# Patient Record
Sex: FEMALE | Race: BLACK OR AFRICAN AMERICAN | Employment: FULL TIME | URBAN - METROPOLITAN AREA
[De-identification: names, ages, dates, MRNs, and addresses within clinical notes are randomized per-mention and may not be internally consistent; named-entity substitution may affect disease eponyms.]

---

## 2020-09-12 ENCOUNTER — HOSPITAL ENCOUNTER (EMERGENCY)
Age: 60
Discharge: HOME | End: 2020-09-13
Attending: EMERGENCY MEDICINE
Payer: COMMERCIAL

## 2020-09-12 PROCEDURE — 99282 EMERGENCY DEPT VISIT SF MDM: CPT

## 2020-09-12 PROCEDURE — 6370000000 HC RX 637 (ALT 250 FOR IP): Performed by: EMERGENCY MEDICINE

## 2020-09-12 RX ORDER — IBUPROFEN 400 MG/1
400 TABLET ORAL ONCE
Status: COMPLETED | OUTPATIENT
Start: 2020-09-13 | End: 2020-09-12

## 2020-09-12 RX ORDER — HYDROCODONE BITARTRATE AND ACETAMINOPHEN 5; 325 MG/1; MG/1
1 TABLET ORAL ONCE
Status: COMPLETED | OUTPATIENT
Start: 2020-09-13 | End: 2020-09-12

## 2020-09-12 RX ADMIN — HYDROCODONE BITARTRATE AND ACETAMINOPHEN 1 TABLET: 5; 325 TABLET ORAL at 23:55

## 2020-09-12 RX ADMIN — IBUPROFEN 400 MG: 400 TABLET, FILM COATED ORAL at 23:55

## 2020-09-12 ASSESSMENT — PAIN DESCRIPTION - LOCATION: LOCATION: ABDOMEN

## 2020-09-12 ASSESSMENT — PAIN SCALES - GENERAL
PAINLEVEL_OUTOF10: 9
PAINLEVEL_OUTOF10: 9

## 2020-09-12 ASSESSMENT — PAIN DESCRIPTION - ORIENTATION: ORIENTATION: RIGHT

## 2020-09-12 ASSESSMENT — PAIN DESCRIPTION - PAIN TYPE: TYPE: ACUTE PAIN

## 2020-09-12 ASSESSMENT — PAIN DESCRIPTION - FREQUENCY: FREQUENCY: CONTINUOUS

## 2020-09-12 ASSESSMENT — PAIN DESCRIPTION - DESCRIPTORS: DESCRIPTORS: ACHING;BURNING;CONSTANT

## 2020-09-13 VITALS
HEIGHT: 60 IN | RESPIRATION RATE: 20 BRPM | BODY MASS INDEX: 33.41 KG/M2 | HEART RATE: 60 BPM | OXYGEN SATURATION: 98 % | DIASTOLIC BLOOD PRESSURE: 89 MMHG | SYSTOLIC BLOOD PRESSURE: 187 MMHG | TEMPERATURE: 97.5 F | WEIGHT: 170.19 LBS

## 2020-09-13 PROCEDURE — 6360000002 HC RX W HCPCS: Performed by: EMERGENCY MEDICINE

## 2020-09-13 PROCEDURE — 90471 IMMUNIZATION ADMIN: CPT | Performed by: EMERGENCY MEDICINE

## 2020-09-13 PROCEDURE — 90715 TDAP VACCINE 7 YRS/> IM: CPT | Performed by: EMERGENCY MEDICINE

## 2020-09-13 RX ORDER — MELOXICAM 15 MG/1
15 TABLET ORAL DAILY PRN
COMMUNITY
Start: 2020-07-07

## 2020-09-13 RX ORDER — AMLODIPINE BESYLATE 10 MG/1
10 TABLET ORAL DAILY
COMMUNITY
Start: 2020-05-13

## 2020-09-13 RX ORDER — HYDRALAZINE HYDROCHLORIDE 25 MG/1
25 TABLET, FILM COATED ORAL 3 TIMES DAILY
COMMUNITY
Start: 2020-01-16

## 2020-09-13 RX ORDER — ZOLPIDEM TARTRATE 10 MG/1
TABLET ORAL NIGHTLY PRN
COMMUNITY

## 2020-09-13 RX ORDER — DIAPER,BRIEF,INFANT-TODD,DISP
EACH MISCELLANEOUS 2 TIMES DAILY
COMMUNITY
Start: 2020-08-17

## 2020-09-13 RX ORDER — ESCITALOPRAM OXALATE 20 MG/1
20 TABLET ORAL DAILY
COMMUNITY
Start: 2020-04-23

## 2020-09-13 RX ADMIN — TETANUS TOXOID, REDUCED DIPHTHERIA TOXOID AND ACELLULAR PERTUSSIS VACCINE, ADSORBED 0.5 ML: 5; 2.5; 8; 8; 2.5 SUSPENSION INTRAMUSCULAR at 01:08

## 2020-09-13 ASSESSMENT — PAIN SCALES - GENERAL: PAINLEVEL_OUTOF10: 5

## 2020-09-13 ASSESSMENT — PAIN DESCRIPTION - PAIN TYPE: TYPE: ACUTE PAIN

## 2020-09-13 ASSESSMENT — PAIN DESCRIPTION - ORIENTATION: ORIENTATION: RIGHT

## 2020-09-13 ASSESSMENT — PAIN DESCRIPTION - LOCATION: LOCATION: ABDOMEN

## 2020-09-13 ASSESSMENT — PAIN DESCRIPTION - FREQUENCY: FREQUENCY: CONTINUOUS
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
## 2022-11-22 ENCOUNTER — OFFICE VISIT (OUTPATIENT)
Dept: ORTHOPEDIC SURGERY | Age: 62
End: 2022-11-22
Payer: COMMERCIAL

## 2022-11-22 ENCOUNTER — TELEPHONE (OUTPATIENT)
Dept: ORTHOPEDIC SURGERY | Age: 62
End: 2022-11-22

## 2022-11-22 VITALS — WEIGHT: 170 LBS | BODY MASS INDEX: 33.38 KG/M2 | HEIGHT: 60 IN

## 2022-11-22 DIAGNOSIS — M54.41 RIGHT-SIDED LOW BACK PAIN WITH RIGHT-SIDED SCIATICA, UNSPECIFIED CHRONICITY: Primary | ICD-10-CM

## 2022-11-22 DIAGNOSIS — M54.50 LOW BACK PAIN, UNSPECIFIED BACK PAIN LATERALITY, UNSPECIFIED CHRONICITY, UNSPECIFIED WHETHER SCIATICA PRESENT: ICD-10-CM

## 2022-11-22 DIAGNOSIS — M17.11 OSTEOARTHRITIS OF RIGHT KNEE, UNSPECIFIED OSTEOARTHRITIS TYPE: ICD-10-CM

## 2022-11-22 DIAGNOSIS — M22.41 CHONDROMALACIA OF PATELLA, RIGHT: ICD-10-CM

## 2022-11-22 DIAGNOSIS — M25.551 RIGHT HIP PAIN: ICD-10-CM

## 2022-11-22 DIAGNOSIS — M51.36 DDD (DEGENERATIVE DISC DISEASE), LUMBAR: ICD-10-CM

## 2022-11-22 DIAGNOSIS — M25.561 ACUTE PAIN OF RIGHT KNEE: ICD-10-CM

## 2022-11-22 DIAGNOSIS — M65.9 SYNOVITIS OF RIGHT KNEE: ICD-10-CM

## 2022-11-22 DIAGNOSIS — M79.604 RIGHT LEG PAIN: ICD-10-CM

## 2022-11-22 DIAGNOSIS — M16.11 PRIMARY OSTEOARTHRITIS OF RIGHT HIP: ICD-10-CM

## 2022-11-22 PROCEDURE — G8417 CALC BMI ABV UP PARAM F/U: HCPCS | Performed by: FAMILY MEDICINE

## 2022-11-22 PROCEDURE — 20610 DRAIN/INJ JOINT/BURSA W/O US: CPT | Performed by: FAMILY MEDICINE

## 2022-11-22 PROCEDURE — L0625 LO FLEX L1-BELOW L5 PRE OTS: HCPCS | Performed by: FAMILY MEDICINE

## 2022-11-22 PROCEDURE — G8427 DOCREV CUR MEDS BY ELIG CLIN: HCPCS | Performed by: FAMILY MEDICINE

## 2022-11-22 PROCEDURE — 99203 OFFICE O/P NEW LOW 30 MIN: CPT | Performed by: FAMILY MEDICINE

## 2022-11-22 PROCEDURE — G8484 FLU IMMUNIZE NO ADMIN: HCPCS | Performed by: FAMILY MEDICINE

## 2022-11-22 PROCEDURE — L1812 KO ELASTIC W/JOINTS PRE OTS: HCPCS | Performed by: FAMILY MEDICINE

## 2022-11-22 RX ORDER — BETAMETHASONE SODIUM PHOSPHATE AND BETAMETHASONE ACETATE 3; 3 MG/ML; MG/ML
12 INJECTION, SUSPENSION INTRA-ARTICULAR; INTRALESIONAL; INTRAMUSCULAR; SOFT TISSUE ONCE
Status: COMPLETED | OUTPATIENT
Start: 2022-11-22 | End: 2022-11-22

## 2022-11-22 RX ORDER — MELOXICAM 15 MG/1
15 TABLET ORAL DAILY
Qty: 30 TABLET | Refills: 3 | Status: SHIPPED | OUTPATIENT
Start: 2022-11-22

## 2022-11-22 RX ORDER — BUSPIRONE HYDROCHLORIDE 5 MG/1
TABLET ORAL
COMMUNITY
Start: 2022-11-14

## 2022-11-22 RX ORDER — METHYLPREDNISOLONE 4 MG/1
TABLET ORAL
Qty: 21 KIT | Refills: 0 | Status: SHIPPED | OUTPATIENT
Start: 2022-11-22

## 2022-11-22 RX ORDER — LIDOCAINE HYDROCHLORIDE 10 MG/ML
1 INJECTION, SOLUTION INFILTRATION; PERINEURAL ONCE
Status: COMPLETED | OUTPATIENT
Start: 2022-11-22 | End: 2022-11-22

## 2022-11-22 RX ORDER — AMOXICILLIN 500 MG/1
CAPSULE ORAL
COMMUNITY
Start: 2022-09-16

## 2022-11-22 RX ORDER — BUPIVACAINE HYDROCHLORIDE 2.5 MG/ML
2 INJECTION, SOLUTION INFILTRATION; PERINEURAL ONCE
Status: COMPLETED | OUTPATIENT
Start: 2022-11-22 | End: 2022-11-22

## 2022-11-22 RX ADMIN — BETAMETHASONE SODIUM PHOSPHATE AND BETAMETHASONE ACETATE 12 MG: 3; 3 INJECTION, SUSPENSION INTRA-ARTICULAR; INTRALESIONAL; INTRAMUSCULAR; SOFT TISSUE at 11:44

## 2022-11-22 RX ADMIN — LIDOCAINE HYDROCHLORIDE 1 ML: 10 INJECTION, SOLUTION INFILTRATION; PERINEURAL at 11:45

## 2022-11-22 RX ADMIN — BUPIVACAINE HYDROCHLORIDE 5 MG: 2.5 INJECTION, SOLUTION INFILTRATION; PERINEURAL at 11:44

## 2022-11-22 NOTE — PATIENT INSTRUCTIONS
Take Medrol first for 6 days. This is a steroid pack. Flip the package over to the foil side and the directions will tell you to start with 6 pills the first day, 5 pills the second day, etc. Please do not take any other anti-inflammatories with the medrol dose karen as this can upset your stomach. If something else is needed, you may take extra strength tylenol.      Once you are finished with the medrol, then you may start your anti-inflammatory: MELOXICAM 1 TIME PER DAY

## 2022-11-22 NOTE — TELEPHONE ENCOUNTER
Left voicemail for patient that their MRI has been authorized and that they can call and schedule scan at their convenience. Also told them that they can call and schedule a f/u with Dr. Charlene Washington once they have MRI scheduled, leaving at least 2-3 days for our office to receive their results.

## 2022-11-22 NOTE — PROGRESS NOTES
Chief Complaint  Back Problem (NP LUMBAR BACK)      Low back pain with radicular symptoms, R knee pain, R hip pain    History of Present Illness:  Nataly Clark is a pleasant 58 y.o. black female who works as a  at the [employer] who presents today for kind consultation from Dr. Luciano Calles regarding low back pain with radicular symptoms, right knee pain, and right hip pain. She has worked as a  at [employer] for the past 29 years. She states she used to have to carry heavy totes to and from the basement but this has been greatly reduced since she began experiencing symptoms. She states her low back pain began approximately one year ago and has gradually gotten worse since that time. She states her pain originates in her right lower lumbar spine and \"tailbone\" and radiates down the posterior aspect of her right leg extending to her mid calf. This is primarily in the L5-S1 distribution. There is no history of injury fall or trauma prior to becoming symptomatic. Additionally, she complains of right hip pain that radiates anteriorly into her groin and right anteromedial knee pain. She states pain is exacerbated with stairs and makes it hard for her to lift her right leg. She endorses having to \"swing\" her right hip out to climb stairs. She described her back pain as a \"dull ache\" with a \"shooting\" pain down her right leg. Pain is an 8/10. She states slight forward flexion does help to alleviate some of her back pain. She takes 600mg ibuprofen only episodic, which does help some. She states her knee is the most bothersome followed by low back then hip. She has been taking episodic doses of ibuprofen recently saw Dr. Luciano Calles in the office who recommended today's orthopedic consultation regarding worsening mechanical back pain and knee pain as well as pain laterally involving the hip with only mild groin pain.          Pain Assessment  Location of Pain: Back  Location Modifiers: Right  Severity of Pain: 10  Quality of Pain: Sharp, Throbbing  Duration of Pain: Persistent  Frequency of Pain: Constant  Aggravating Factors: Bending, Stretching, Stairs  Limiting Behavior: Yes  Result of Injury: Yes  Work-Related Injury: No  Are there other pain locations you wish to document?: No       Medical History  Patient's medications, allergies, past medical, surgical, social and family histories were reviewed and updated as appropriate. Review of Systems  Pertinent items are noted in HPI  Review of systems reviewed from Patient History Form dated on 11/22/22 and available in the patient's chart under the Media tab. Vital Signs  There were no vitals filed for this visit. General Exam:     Constitutional: Patient is adequately groomed with no evidence of malnutrition  DTRs: Deep tendon reflexes are intact  Mental Status: The patient is oriented to time, place and person. The patient's mood and affect are appropriate. Lymphatic: The lymphatic examination bilaterally reveals all areas to be without enlargement or induration. Vascular: Examination reveals no swelling or calf tenderness. Peripheral pulses are palpable and 2+. Neurological: The patient has good coordination. There is no weakness or sensory deficit. Lumbar/Sacral Spine Examination  Inspection:  No obvious deformity. There is no palpable spasm or focal swelling. Palpation:  Tenderness to palpation about L5/S1. Tenderness to palpation of right lumbar paraspinals right lower lumbar facets with central gluteal tenderness. He does have some tenderness over the IT band mildly returning to her bursa. Rang of Motion:  Slight limitation with right thoracic rotation as compared to the left secondary to hypertonic musculature. She can forward flex her lumbar paraspinal pulling to about 60 degrees.   She does have painful extension right at 6-7 out of 10 with left being about a 4-5 with a 50% reduction in lateral bending rotation. Strength:  4+ out of 5 strength of RLE. Special Tests: He does have pain with lumbar extension. She does complain of some gluteal discomfort with straight leg raising on the right side. Negative on the left. Logrolling and hip impingement testing. Negative belly trace positive Sonia's today. Skin: There are no rashes, ulcerations or lesions. Distal motor sensory and vascular exams intact. Gait: Discomfort with positional change. Reflexes:  Symmetrically preserved     Additional Comments: Evaluation of the right knee does reveal patellofemoral crepitation but only a trace knee joint effusion. She does have tenderness over the medial and lateral patellofemoral facet with tenderness at 7 8 out of 10 reproduced patellar grind testing with diffuse primarily anterior third medial joint line tenderness more so than laterally. Hamstrings and IT bands are tight. I do not sense high-grade instability. Right hip evaluation does reveal the above-mentioned tenderness over the IT band and to lesser degree trochanteric bursa. She does have mild hip weakness 4+ out of 5 with hip flexion and abduction. Once again her straight leg raising does produce gluteal pain on the right but her logroll testing is not overly positive. No high-grade labral clicks or evidence of instability. Mild discomfort with hip impingement testing on the right. Additional Examinations:  Left Lower Extremity: Examination of the left lower extremity does not show any tenderness, deformity or injury. Range of motion is unremarkable. There is no gross instability. There are no rashes, ulcerations or lesions. Strength and tone are normal.  Contralateral exam: Examination of the left knee reveals intact skin. There is no focal tenderness. The patient demonstrates full painless range of motion with regards to flexion and extension. Strength is 5/5 thorough out all planes. Ligamentous stability is grossly intact. Contralateral exam: Examination of the left contralateral hip reveals intact skin. The patient demonstrates full painless range of motion with regards to flexion, abduction, internal and external rotation. There is not tenderness about the greater trochanter. There is a negative straight leg raise against resistance. Strength is 5/5 thorough out all planes. Diagnostic Test Findings:  Plain film radiographs of lumbar spine AP and lateral, right hip/pelvis AP pelvis and frog films, and right knee AP and PA weightbearing sunrise and lateral films obtained today in office and revealed DDD of lumbar spine with facet arthropathy and loss of lumbar lordosis, and mild to moderate degenerative changes about the right hip and evidence of mild/moderate medial and severe anterior compartment right knee osteoarthritis with patellofemoral arthropathy and extensive patellofemoral spurring. Assessment: 1 year status post progressively worsening chronic low back pain with right leg pain suspicious for right chronic L5-S1 radiculopathy. 2.  1 year status post symptomatic severe right anterior knee patellofemoral arthropathy with at least moderate medial compartment osteoarthritis with knee pain and synovitis. 3.  1 year status post symptomatic mild to moderate right hip osteoarthritis with hip weakness and IT band     Impression:  Encounter Diagnoses   Name Primary?     Right-sided low back pain with right-sided sciatica, unspecified chronicity Yes    Right hip pain     Acute pain of right knee     Osteoarthritis of right knee, unspecified osteoarthritis type     Low back pain, unspecified back pain laterality, unspecified chronicity, unspecified whether sciatica present     Synovitis of right knee     Chondromalacia of patella, right     Right leg pain     Primary osteoarthritis of right hip     DDD (degenerative disc disease), lumbar        Office Procedures:  Orders Placed This Encounter   Procedures    XR LUMBAR SPINE (2-3 VIEWS)     Standing Status:   Future     Number of Occurrences:   1     Standing Expiration Date:   11/22/2023    XR HIP 2-3 VW W PELVIS RIGHT     Standing Status:   Future     Number of Occurrences:   1     Standing Expiration Date:   12/22/2022     Order Specific Question:   Reason for exam:     Answer:   hip pain    XR KNEE RIGHT (MIN 4 VIEWS)     4V R Knee     Standing Status:   Future     Number of Occurrences:   1     Standing Expiration Date:   12/22/2022     Order Specific Question:   Reason for exam:     Answer:   Knee Pain    MRI LUMBAR SPINE WO CONTRAST     Standing Status:   Future     Standing Expiration Date:   11/22/2023     Scheduling Instructions:      [facility] [place] [address]      [phone]            AUTH #:      TIME AND DATE TBD      PLEASE CALL PATIENT ONCE APPROVED TO SCHEDULE       PUSH TO [facility] SYSTEM            Remember that it may take several business days to pre-cert your MRI through your insurance. Our office will contact you as soon as we have the approval. We will not give any test results over the phone. Please call [phone] once you have your test day and time to schedule a follow up with Dr. Harrison Woods. Order Specific Question:   Reason for exam:     Answer:   EVALUATE LUMBAR DDD, R/O HNP AT L5, S1 AND FORAMINAL NARROWING    Ambulatory referral to Physical Therapy     Referral Priority:   Routine     Referral Type:   Eval and Treat     Referral Reason:   Specialty Services Required     Referred to Provider:   Gennette Schilder, PT     Requested Specialty:   Physical Therapist     Number of Visits Requested:   1 [id number] - ID DRAIN/INJECT LARGE JOINT/BURSA    Breg Economy Hinged Knee WrapAround Brace     Patient was prescribed a Breg Economy Hinged Wrap Around Knee Brace.   The right knee will require stabilization / immobilization from this semi-rigid / rigid orthosis to improve their function. The orthosis will assist in protecting the affected area, provide functional support and facilitate healing. The patient was educated and fit by a healthcare professional with expert knowledge and specialization in brace application while under the direct supervision of the treating physician. Verbal and written instructions for the use of and application of this item were provided. They were instructed to contact the office immediately should the brace result in increased pain, decreased sensation, increased swelling or worsening of the condition. Doristine Lab and Mook Extensor Lumbosacral Support Brace (Warm and Form)     Patient was prescribed a Bird and Mook Extensor Lumbosacral Support Brace with a pocket. This orthosis is required for the following reasons:    Reduce pain by restricting mobility of the trunk    The patient was educated and fit by a healthcare professional with expert knowledge and specialization in brace application while under the direct supervision of the treating physician. Verbal and written instructions for the use of and application of this item were provided. They were instructed to contact the office immediately should the brace result in increased pain, decreased sensation, increased swelling or worsening of the condition. Treatment Plan:  Treatment options were discussed with Nataly Dobbs. Relevant images and clinical exam findings reviewed and discussed with patient. I do suspect that she has multiple pain generators with her right knee and lower back and right leg symptoms being her most problematic areas with secondary likely right hip IT band with hip weakness. Her knee is her worst problem at this point and she does have very advanced patellofemoral arthropathy. After discussing options, we did inject her right knee today using 2 cc of Celestone, 2 cc of Marcaine, 1 cc Xylocaine. We did place her in a wraparound knee brace. We also gave her a warm-and-form belt and placed her on a Medrol Dosepak to be followed by meloxicam 15 mg daily and we will set her up for lumbar MRI to evaluate for right-sided L4-5/L5-S1 disc herniation and/or foraminal narrowing. I would like for her to start aggressive physical therapy for her back and knee as well as her hip. We will see her back post imaging of her lumbar spine although I think she can start physical therapy at this point. I am okay with her working in the brace. Icing and activity modification was discussed. She will contact us in the interim with questions or concerns. CC: Dr. Jerilyn Perdue    This dictation was performed with a verbal recognition program [facility]) and it was checked for errors. It is possible that there are still dictated errors within this office note. If so, please bring any errors to my attention for an addendum. All efforts were made to ensure that this office note is accurate.

## 2022-11-30 ENCOUNTER — HOSPITAL ENCOUNTER (OUTPATIENT)
Dept: PHYSICAL THERAPY | Age: 62
Setting detail: THERAPIES SERIES
Discharge: HOME | End: 2022-11-30

## 2022-11-30 NOTE — FLOWSHEET NOTE
Physical Therapy  Cancellation/No-show Note  Patient Name:  Morris Lockett  :  1960   Date:  2022  Cancelled visits to date: 0  No-shows to date: 01    For today's appointment patient:  []  Cancelled  []  Rescheduled appointment  [x]  No-show     Reason given by patient:  []  Patient ill  []  Conflicting appointment  []  No transportation    []  Conflict with work  []  No reason given  []  Other:     Comments:  Voicemail was left for patient.      Electronically signed by:  Pearle Councilman, PT, DPT

## 2023-03-10 DIAGNOSIS — M25.551 RIGHT HIP PAIN: ICD-10-CM

## 2023-03-10 DIAGNOSIS — M22.41 CHONDROMALACIA OF PATELLA, RIGHT: ICD-10-CM

## 2023-03-10 DIAGNOSIS — M16.11 PRIMARY OSTEOARTHRITIS OF RIGHT HIP: ICD-10-CM

## 2023-03-10 DIAGNOSIS — M54.41 RIGHT-SIDED LOW BACK PAIN WITH RIGHT-SIDED SCIATICA, UNSPECIFIED CHRONICITY: ICD-10-CM

## 2023-03-10 DIAGNOSIS — M54.50 LOW BACK PAIN, UNSPECIFIED BACK PAIN LATERALITY, UNSPECIFIED CHRONICITY, UNSPECIFIED WHETHER SCIATICA PRESENT: ICD-10-CM

## 2023-03-10 DIAGNOSIS — M65.9 SYNOVITIS OF RIGHT KNEE: ICD-10-CM

## 2023-03-10 DIAGNOSIS — M79.604 RIGHT LEG PAIN: ICD-10-CM

## 2023-03-10 DIAGNOSIS — M17.11 OSTEOARTHRITIS OF RIGHT KNEE, UNSPECIFIED OSTEOARTHRITIS TYPE: ICD-10-CM

## 2023-03-10 DIAGNOSIS — M25.561 ACUTE PAIN OF RIGHT KNEE: ICD-10-CM

## 2023-03-10 DIAGNOSIS — M51.36 DDD (DEGENERATIVE DISC DISEASE), LUMBAR: ICD-10-CM

## 2023-03-10 RX ORDER — MELOXICAM 15 MG/1
TABLET ORAL
Qty: 30 TABLET | Refills: 3 | Status: SHIPPED | OUTPATIENT
Start: 2023-03-10

## 2023-12-03 ENCOUNTER — HOSPITAL ENCOUNTER (EMERGENCY)
Age: 63
Discharge: HOME | End: 2023-12-03
Payer: OTHER MISCELLANEOUS

## 2023-12-03 ENCOUNTER — APPOINTMENT (OUTPATIENT)
Dept: CT IMAGING | Age: 63
End: 2023-12-03
Payer: OTHER MISCELLANEOUS

## 2023-12-03 VITALS
WEIGHT: 171.3 LBS | SYSTOLIC BLOOD PRESSURE: 117 MMHG | HEIGHT: 60 IN | HEART RATE: 69 BPM | RESPIRATION RATE: 16 BRPM | DIASTOLIC BLOOD PRESSURE: 72 MMHG | BODY MASS INDEX: 33.63 KG/M2 | TEMPERATURE: 98.9 F | OXYGEN SATURATION: 98 %

## 2023-12-03 DIAGNOSIS — S29.019A THORACIC MYOFASCIAL STRAIN, INITIAL ENCOUNTER: ICD-10-CM

## 2023-12-03 DIAGNOSIS — S16.1XXA ACUTE STRAIN OF NECK MUSCLE, INITIAL ENCOUNTER: ICD-10-CM

## 2023-12-03 DIAGNOSIS — S39.012A STRAIN OF LUMBAR REGION, INITIAL ENCOUNTER: ICD-10-CM
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
## 2023-12-03 PROCEDURE — 72125 CT NECK SPINE W/O DYE: CPT

## 2023-12-03 PROCEDURE — 72131 CT LUMBAR SPINE W/O DYE: CPT

## 2023-12-03 PROCEDURE — 70450 CT HEAD/BRAIN W/O DYE: CPT

## 2023-12-03 PROCEDURE — 99284 EMERGENCY DEPT VISIT MOD MDM: CPT

## 2023-12-03 PROCEDURE — 72128 CT CHEST SPINE W/O DYE: CPT

## 2023-12-03 PROCEDURE — 6370000000 HC RX 637 (ALT 250 FOR IP): Performed by: PHYSICIAN ASSISTANT

## 2023-12-03 RX ORDER — BACLOFEN 10 MG/1
10 TABLET ORAL 3 TIMES DAILY
Qty: 21 TABLET | Refills: 0 | Status: SHIPPED | OUTPATIENT
Start: 2023-12-03 | End: 2023-12-10

## 2023-12-03 RX ORDER — IBUPROFEN 400 MG/1
800 TABLET ORAL ONCE
Status: COMPLETED | OUTPATIENT
Start: 2023-12-03 | End: 2023-12-03

## 2023-12-03 RX ORDER — KETOROLAC TROMETHAMINE 15 MG/ML
15 INJECTION, SOLUTION INTRAMUSCULAR; INTRAVENOUS ONCE
Status: DISCONTINUED | OUTPATIENT
Start: 2023-12-03 | End: 2023-12-03

## 2023-12-03 RX ORDER — METHYLPREDNISOLONE 4 MG/1
TABLET ORAL
Qty: 1 KIT | Refills: 0 | Status: SHIPPED | OUTPATIENT
Start: 2023-12-03 | End: 2023-12-09

## 2023-12-03 RX ORDER — ACETAMINOPHEN 325 MG/1
650 TABLET ORAL ONCE
Status: COMPLETED | OUTPATIENT
Start: 2023-12-03 | End: 2023-12-03

## 2023-12-03 RX ORDER — CYCLOBENZAPRINE HCL 10 MG
10 TABLET ORAL ONCE
Status: COMPLETED | OUTPATIENT
Start: 2023-12-03 | End: 2023-12-03

## 2023-12-03 RX ORDER — ACETAMINOPHEN 500 MG
1000 TABLET ORAL EVERY 8 HOURS PRN
Qty: 60 TABLET | Refills: 0 | Status: SHIPPED | OUTPATIENT
Start: 2023-12-03 | End: 2023-12-13

## 2023-12-03 RX ADMIN — ACETAMINOPHEN 650 MG: 325 TABLET ORAL at 11:02

## 2023-12-03 RX ADMIN — IBUPROFEN 800 MG: 400 TABLET, FILM COATED ORAL at 13:13

## 2023-12-03 RX ADMIN — CYCLOBENZAPRINE 10 MG: 10 TABLET, FILM COATED ORAL at 11:02

## 2023-12-03 ASSESSMENT — PAIN SCALES - GENERAL
PAINLEVEL_OUTOF10: 10
PAINLEVEL_OUTOF10: 8
PAINLEVEL_OUTOF10: 8

## 2023-12-03 ASSESSMENT — PAIN DESCRIPTION - LOCATION: LOCATION: BACK

## 2023-12-03 ASSESSMENT — PAIN - FUNCTIONAL ASSESSMENT: PAIN_FUNCTIONAL_ASSESSMENT: 0-10
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
## 2024-04-23 ENCOUNTER — OFFICE VISIT (OUTPATIENT)
Dept: ORTHOPEDIC SURGERY | Age: 64
End: 2024-04-23
Payer: COMMERCIAL

## 2024-04-23 VITALS — HEIGHT: 60 IN | BODY MASS INDEX: 33.63 KG/M2 | WEIGHT: 171.3 LBS

## 2024-04-23 DIAGNOSIS — M65.9 SYNOVITIS OF RIGHT KNEE: ICD-10-CM

## 2024-04-23 DIAGNOSIS — M17.11 LOCALIZED OSTEOARTHRITIS OF RIGHT KNEE: ICD-10-CM

## 2024-04-23 DIAGNOSIS — M25.561 ACUTE PAIN OF RIGHT KNEE: Primary | ICD-10-CM

## 2024-04-23 DIAGNOSIS — M22.41 CHONDROMALACIA PATELLAE OF RIGHT KNEE: ICD-10-CM

## 2024-04-23 PROCEDURE — 99214 OFFICE O/P EST MOD 30 MIN: CPT | Performed by: FAMILY MEDICINE

## 2024-04-23 PROCEDURE — 20610 DRAIN/INJ JOINT/BURSA W/O US: CPT | Performed by: FAMILY MEDICINE

## 2024-04-23 PROCEDURE — 1036F TOBACCO NON-USER: CPT | Performed by: FAMILY MEDICINE

## 2024-04-23 PROCEDURE — G8417 CALC BMI ABV UP PARAM F/U: HCPCS | Performed by: FAMILY MEDICINE

## 2024-04-23 PROCEDURE — 3017F COLORECTAL CA SCREEN DOC REV: CPT | Performed by: FAMILY MEDICINE

## 2024-04-23 PROCEDURE — G8427 DOCREV CUR MEDS BY ELIG CLIN: HCPCS | Performed by: FAMILY MEDICINE

## 2024-04-23 PROCEDURE — L1812 KO ELASTIC W/JOINTS PRE OTS: HCPCS | Performed by: FAMILY MEDICINE

## 2024-04-23 RX ORDER — MELOXICAM 15 MG/1
15 TABLET ORAL DAILY
Qty: 30 TABLET | Refills: 3 | Status: SHIPPED | OUTPATIENT
Start: 2024-04-23

## 2024-04-23 RX ORDER — BETAMETHASONE SODIUM PHOSPHATE AND BETAMETHASONE ACETATE 3; 3 MG/ML; MG/ML
12 INJECTION, SUSPENSION INTRA-ARTICULAR; INTRALESIONAL; INTRAMUSCULAR; SOFT TISSUE ONCE
Status: COMPLETED | OUTPATIENT
Start: 2024-04-23 | End: 2024-04-23

## 2024-04-23 RX ORDER — LIDOCAINE HYDROCHLORIDE 10 MG/ML
1 INJECTION, SOLUTION INFILTRATION; PERINEURAL ONCE
Status: COMPLETED | OUTPATIENT
Start: 2024-04-23 | End: 2024-04-23

## 2024-04-23 RX ORDER — BUPIVACAINE HYDROCHLORIDE 2.5 MG/ML
2 INJECTION, SOLUTION INFILTRATION; PERINEURAL ONCE
Status: COMPLETED | OUTPATIENT
Start: 2024-04-23 | End: 2024-04-23

## 2024-04-23 RX ADMIN — BUPIVACAINE HYDROCHLORIDE 5 MG: 2.5 INJECTION, SOLUTION INFILTRATION; PERINEURAL at 15:17

## 2024-04-23 RX ADMIN — LIDOCAINE HYDROCHLORIDE 1 ML: 10 INJECTION, SOLUTION INFILTRATION; PERINEURAL at 15:18

## 2024-04-23 RX ADMIN — BETAMETHASONE SODIUM PHOSPHATE AND BETAMETHASONE ACETATE 12 MG: 3; 3 INJECTION, SUSPENSION INTRA-ARTICULAR; INTRALESIONAL; INTRAMUSCULAR; SOFT TISSUE at 15:17

## 2024-04-23 NOTE — PROGRESS NOTES
Chief Complaint  Knee Pain (OPNP RIGHT KNEE/)      Initial consultation ongoing progressively worsening right anterior knee pain with difficulty with squatting kneeling stair climbing and positional change    History of Present Illness:  Nataly Dobbs is a 64 y.o. female who works as a  at the [employer] who presents today for kind consultation from Dr. Diya Vasques regarding for evaluation of progressively worsening pain to primarily anterior aspect of her right knee.  She states that she has been having on and off pain to her right knee primarily anteriorly for a number of years but states that since roughly January 2024, it has been more painful and consistent.  There is no history of injury trauma or new activity.  It is primarily anterior nature associated with positional changes any type of squatting and stair climbing kneeling and with holding her knee 90 degree flexed position.  Her pain can range very much so between a 0-8 out of 10.  She has had some swelling and recent did see Diya in the office who recommend that she see us today for evaluation.  She has tried Voltaren gel with minimal effectiveness and has not been having pseudo buckling.  She does deny active locking or catching but will have some episodes of pseudo buckling and pain has progressed to the point where is now bothering her at night.  There is a family history of osteoarthritis but not arthroplasty.  She is being seen today for orthopedic and sports consultation with initial imaging      Pain Assessment  Location of Pain: Knee  Location Modifiers: Right  Severity of Pain: 10  Quality of Pain: Sharp, Other (Comment) (SHOOTING)  Duration of Pain: Persistent  Frequency of Pain: Constant  Aggravating Factors: Walking, Standing, Other (Comment) (SIT TO STAND)  Limiting Behavior: Yes  Relieving Factors: Rest  Result of Injury: No  Work-Related Injury: No  Are there other pain locations you wish to document?: No

## 2024-04-25 ENCOUNTER — TELEPHONE (OUTPATIENT)
Dept: ORTHOPEDIC SURGERY | Age: 64
End: 2024-04-25

## 2024-04-25 NOTE — TELEPHONE ENCOUNTER
LVM for patient that euflexxa injections have been approved for right knee. Told patient they could call central scheduling at [phone] at their convenience to schedule those appointments. Also told them if they had any problems or questions, they could call me directly at [phone]